# Patient Record
Sex: MALE | Race: WHITE | URBAN - METROPOLITAN AREA
[De-identification: names, ages, dates, MRNs, and addresses within clinical notes are randomized per-mention and may not be internally consistent; named-entity substitution may affect disease eponyms.]

---

## 2020-05-11 ENCOUNTER — TELEPHONE (OUTPATIENT)
Dept: FAMILY MEDICINE CLINIC | Facility: CLINIC | Age: 45
End: 2020-05-11

## 2020-05-11 DIAGNOSIS — Z20.822 EXPOSURE TO COVID-19 VIRUS: ICD-10-CM

## 2020-05-11 DIAGNOSIS — J06.9 UPPER RESPIRATORY TRACT INFECTION, UNSPECIFIED TYPE: Primary | ICD-10-CM

## 2020-05-11 PROCEDURE — 99442 PHONE E/M BY PHYS 11-20 MIN: CPT | Performed by: FAMILY MEDICINE

## 2020-05-11 NOTE — TELEPHONE ENCOUNTER
Virtual Telephone Check-In    Adán Jorge verbally consents to a Virtual/Telephone Check-In visit on 05/11/20. Patient understands and accepts financial responsibility for any deductible, co-insurance and/or co-pays associated with this service.     [place]

## 2020-05-11 NOTE — TELEPHONE ENCOUNTER
Patients spouse states patient had fever and body aches two weeks ago. No longer having symptoms and was not in known contact with anyone who was covid positive. Patient needs clearance to go back to work and would prefer an in office visit.

## 2020-05-12 ENCOUNTER — TELEPHONE (OUTPATIENT)
Dept: FAMILY MEDICINE CLINIC | Facility: CLINIC | Age: 45
End: 2020-05-12

## 2020-05-12 DIAGNOSIS — B34.9 VIRAL SYNDROME: Primary | ICD-10-CM

## 2020-05-12 DIAGNOSIS — Z20.822 EXPOSURE TO COVID-19 VIRUS: ICD-10-CM

## 2020-05-12 PROCEDURE — 99442 PHONE E/M BY PHYS 11-20 MIN: CPT | Performed by: FAMILY MEDICINE

## 2020-05-12 NOTE — TELEPHONE ENCOUNTER
Virtual Telephone Check-In    Colin Omalley verbally consents to a Virtual/Telephone Check-In visit on 05/12/20. Patient understands and accepts financial responsibility for any deductible, co-insurance and/or co-pays associated with this service.     [place]

## 2020-05-12 NOTE — TELEPHONE ENCOUNTER
Patient's wife states that they were suppose to receive a call yesterday from the doctor, but never got one.  Please call patient back at [phone]

## 2020-05-13 ENCOUNTER — LAB ENCOUNTER (OUTPATIENT)
Dept: LAB | Facility: HOSPITAL | Age: 45
End: 2020-05-13
Attending: FAMILY MEDICINE
Payer: OTHER GOVERNMENT

## 2020-05-13 DIAGNOSIS — B34.9 VIRAL SYNDROME: ICD-10-CM

## 2020-05-13 DIAGNOSIS — Z20.822 EXPOSURE TO COVID-19 VIRUS: ICD-10-CM

## 2020-05-14 ENCOUNTER — TELEPHONE (OUTPATIENT)
Dept: FAMILY MEDICINE CLINIC | Facility: CLINIC | Age: 45
End: 2020-05-14

## 2020-05-14 NOTE — TELEPHONE ENCOUNTER
Pt states that the doctor was suppose to call him back today. Per pt he had a phone visit with the doctor. Pt would like a call back today.

## 2020-05-14 NOTE — TELEPHONE ENCOUNTER
Phone call made relied results to the patient which is positive COVID-19. We can do a re-test again in 10 days to see if negative by then. Patient reports being asymptomatic feeling well with no symptoms whatsoever.

## 2020-05-15 ENCOUNTER — PATIENT OUTREACH (OUTPATIENT)
Dept: CASE MANAGEMENT | Age: 45
End: 2020-05-15

## 2020-05-15 NOTE — TELEPHONE ENCOUNTER
Spoke to patient to enroll in home monitoring program. Patient states he has no symptoms and is asking for a retest so he can show his employer so he can return to work.     Please advise if PCP will provide retest or advise patient to go to [facility] for

## 2020-05-15 NOTE — PROGRESS NOTES
Home Monitoring Condition Update    Covid19+ test date: 5/13/20      Consent Verification:  Assessment Completed With: Patient  HIPAA Verified?   Yes    COVID-19 HOME MONITORING 5/15/2020   Temperature 97   Reading From Mouth   Pulse 56   Pulse taken from household members when possible and stay completely isolated from the general public 3 days after symptoms resolve or 10 days total (whichever is longer). Advised patient If symptoms worsen/ medical emergency to call 911.       Time spent this encounter re

## 2020-05-18 ENCOUNTER — PATIENT OUTREACH (OUTPATIENT)
Dept: CASE MANAGEMENT | Age: 45
End: 2020-05-18

## 2020-05-18 NOTE — PROGRESS NOTES
Home Monitoring Condition Update    Covid19+ test date: 5/13/2020      Consent Verification:  Assessment Completed With: Patient  HIPAA Verified?   Yes    COVID-19 HOME MONITORING 5/18/2020   Temperature 98   Reading From Forehead   Pulse -   Pulse taken call 911.       Time spent this encounter reviewing chart, speaking with patient, gathering resources  Total Time: 10  minutes

## 2020-05-19 ENCOUNTER — PATIENT OUTREACH (OUTPATIENT)
Dept: CASE MANAGEMENT | Age: 45
End: 2020-05-19

## 2020-05-20 ENCOUNTER — PATIENT OUTREACH (OUTPATIENT)
Dept: CASE MANAGEMENT | Age: 45
End: 2020-05-20

## 2020-05-20 NOTE — PROGRESS NOTES
Home Monitoring Condition Update    Covid19+ test date:       Consent Verification:  Assessment Completed With: Patient  HIPAA Verified?   Yes    COVID-19 HOME MONITORING 5/20/2020   Temperature -   Reading From -   Pulse -   Pulse taken from -   [place]

## 2020-05-21 ENCOUNTER — VIRTUAL PHONE E/M (OUTPATIENT)
Dept: FAMILY MEDICINE CLINIC | Facility: CLINIC | Age: 45
End: 2020-05-21
Payer: OTHER GOVERNMENT

## 2020-05-21 DIAGNOSIS — U07.1 COVID-19: Primary | ICD-10-CM

## 2020-05-21 PROCEDURE — 99442 PHONE E/M BY PHYS 11-20 MIN: CPT | Performed by: FAMILY MEDICINE

## 2020-05-21 NOTE — PROGRESS NOTES
Virtual Telephone Check-In    Karen Mckinney verbally consents to a Virtual/Telephone Check-In visit on 05/21/20. Patient has been referred to the [facility] website at [email or web address] to review the yearly Consent to Treat document.     Patient understand

## 2024-02-15 NOTE — TELEPHONE ENCOUNTER
From: Corrine Keen  To: Jer Steen  Sent: 2/14/2024 4:26 PM CST  Subject: Cardiovascular Request    I received a bill from [employer] for charges from 2/15/23 ($285.00) for an office visit. I called [facility] because they denied the claim and was told that Dr. Mulligan or Dr. Alexander Dykes didn't fill out the pre-op request completely. Do I contact Dr. Mulligan/Dr. Dykes office or do Dr. Steen have to contact them? I was told by [facility] to call my primary doctor.  Please advise    Corrine Keen  [phone]   phone encounter made

## (undated) NOTE — LETTER
5/21/2020        To Whom It May Concern:    Amrit Diaz is currently under my medical care and may return to work at this time. Please excuse Edwar Chou for 4 weeks. He may return to work on 05/22/20. Activity is restricted as follows: none.     If you requ